# Patient Record
Sex: FEMALE | Race: WHITE | Employment: FULL TIME | URBAN - METROPOLITAN AREA
[De-identification: names, ages, dates, MRNs, and addresses within clinical notes are randomized per-mention and may not be internally consistent; named-entity substitution may affect disease eponyms.]

---

## 2021-09-11 ENCOUNTER — HOSPITAL ENCOUNTER (EMERGENCY)
Age: 61
Discharge: HOME | End: 2021-09-11
Attending: EMERGENCY MEDICINE
Payer: COMMERCIAL

## 2021-09-11 VITALS
SYSTOLIC BLOOD PRESSURE: 160 MMHG | TEMPERATURE: 98.6 F | HEART RATE: 74 BPM | HEIGHT: 65 IN | BODY MASS INDEX: 37.49 KG/M2 | WEIGHT: 225 LBS | RESPIRATION RATE: 16 BRPM | OXYGEN SATURATION: 98 % | DIASTOLIC BLOOD PRESSURE: 89 MMHG

## 2021-09-11 DIAGNOSIS — R09.81 NASAL CONGESTION: Primary | ICD-10-CM

## 2021-09-11 DIAGNOSIS — Z20.822 EXPOSURE TO COVID-19 VIRUS: ICD-10-CM

## 2021-09-11 PROCEDURE — U0003 INFECTIOUS AGENT DETECTION BY NUCLEIC ACID (DNA OR RNA); SEVERE ACUTE RESPIRATORY SYNDROME CORONAVIRUS 2 (SARS-COV-2) (CORONAVIRUS DISEASE [COVID-19]), AMPLIFIED PROBE TECHNIQUE, MAKING USE OF HIGH THROUGHPUT TECHNOLOGIES AS DESCRIBED BY CMS-2020-01-R: HCPCS

## 2021-09-11 PROCEDURE — 99282 EMERGENCY DEPT VISIT SF MDM: CPT

## 2021-09-11 RX ORDER — ESOMEPRAZOLE MAGNESIUM 40 MG/1
40 CAPSULE, DELAYED RELEASE ORAL DAILY
COMMUNITY

## 2021-09-11 NOTE — DISCHARGE INSTRUCTIONS
Lukas or reduced prescriptions       [facility]       Free Referrals    Hours of Operation       Mon/Tue/Thur/Fri: 10am-2pm       Tue/Thurs: 5pm-8:30pm    Address       [address] [facility] [place], [place]     Phone       [phone]    [address] ([facility] [place])     Lab Work        Prescription Coverage     Hours of Operation        By appointment only         Tue/Thurs: 5pm-8:30pm     Address         [facility] Department         [address], [place]     Phone         [phone]    [facility]         Free or Reduced Prescriptions for Clients, if available    Hours of Operation         Mon-Fri: 8am - 5pm    Address         [address], [place] [address] [place] [address]    Phone         [phone] or Reduced Prescription Coverage     Hours of Operation        Mon: 9am - 11am        Thur: 5:30pm-8pm     Address        [address]          [place], [place]     Phone        [phone] [facility]N, Pediatrics         Pharmacy Assistant Program         Sliding Scale after first 2 visits     Hours of Operation         Mon/Tue/Thur/Fri: 8:30am -5pm         Wed: 10:30am - 7pm     Address         [address] [facility] [address] [place] [address]     Phone          [phone] [facility] and [facility]         [facility]         Sliding Scale Payment         Prescription Program     Hours of Operation         Mon/Wed/Fri: 8:30am - 5pm         Tue: 7am - 7pm         Thur: 8am - noon; 4pm - 8pm      Address         [address] [place] [address] [place], [place]      Phone         [phone]                [facility] [address] [facility]         Reduced Fee Prescriptions     Hours of Operation         Mon-Fri: 8am - 5pm     Address         [address] [place], [place]     Phone         [phone] [address] [facility]         Reduced Fee Prescriptions     Hours of Operation         Mon/Wed/Thur/Fri: 8am - 5pm         Tue: 8am - 7pm     Address         [address] [facility]          [place], [place]     Phone         [phone]

## 2021-09-11 NOTE — ED PROVIDER NOTES
EMERGENCY DEPARTMENT HISTORY AND PHYSICAL EXAM    Date: 9/11/2021  Patient Name: Sam Guardian    History of Presenting Illness     Chief Complaint   Patient presents with    Concern For [id number] (Coronavirus)         History Provided By: Patient    [address] is a 28-year-old female with no known chronic medical conditions presents for evaluation of congestion, runny nose. Patient states that she has been caring for her mom and father who both have Covid. She started having symptoms 3 to 4 days ago. Her symptoms have been minimal, though she wants to make sure that she does not have Covid before she returns to work. Patient otherwise denies chest pain, shortness of breath, nausea, vomiting, diarrhea. Patient has previously been vaccinated. PCP: No primary care provider on file. Current Outpatient Medications   Medication Sig Dispense Refill    esomeprazole (NexIUM) 40 mg capsule Take 40 mg by mouth daily. Past History     Past Medical History:  Past Medical History:   Diagnosis Date    Acid reflux        Past Surgical History:  Past Surgical History:   Procedure Laterality Date    HX MENISCUS REPAIR      HX PARTIAL HYSTERECTOMY      HX ROTATOR CUFF REPAIR      HX TONSILLECTOMY         Family History:  History reviewed. No pertinent family history. Social History:  Social History     Tobacco Use    Smoking status: Never Smoker    Smokeless tobacco: Never Used   Substance Use Topics    Alcohol use: Yes     Comment: socially     Drug use: Not Currently       Allergies:  No Known Allergies      Review of Systems   Review of Systems   Constitutional: Negative for activity change and fever. HENT: Positive for postnasal drip and rhinorrhea. Negative for congestion and sore throat. Eyes: Negative for discharge. Respiratory: Negative for apnea. Cardiovascular: Negative for chest pain. Gastrointestinal: Negative for abdominal distention.    Genitourinary: Negative for dysuria and flank pain. Musculoskeletal: Negative for arthralgias. Skin: Negative for rash. Neurological: Negative for dizziness and weakness. Hematological: Negative for adenopathy. Psychiatric/Behavioral: Negative for agitation. All other systems reviewed and are negative. Physical Exam     Vitals:    09/11/21 1013 09/11/21 1016   BP: (!) 160/89    Pulse: 74    Resp: 16    Temp:  98.6 °F (37 °C)   SpO2: 98%    Weight: 102.1 kg (225 lb)    Height: 5' 5\" (1.651 m)      Physical Exam    Nursing notes and vital signs reviewed    Constitutional: Non toxic appearing, no acute distress  Head: Normocephalic, Atraumatic  Eyes: EOMI  Neck: Supple  Cardiovascular: Regular rate and rhythm, no murmurs, rubs, or gallops  Chest: Normal work of breathing and chest excursion bilaterally  Lungs: Clear to ausculation bilaterally  Abdomen: Soft, non tender, non distended, normoactive bowel sounds  Back: No evidence of trauma or deformity  Extremities: No evidence of trauma or deformity, no LE edema  Skin: Warm and dry, normal cap refill  Neuro: Alert and appropriate, CN intact, normal speech, strength and sensation full and symmetric bilaterally, normal gait, normal coordination  Psychiatric: Normal mood and affect      Diagnostic Study Results     Labs -   No results found for this or any previous visit (from the past 12 hour(s)). No orders to display     CT Results  (Last 48 hours)    None        CXR Results  (Last 48 hours)    None          Medications given in the ED-  Medications - No data to display      Medical Decision Making   I am the first provider for this patient. I reviewed the vital signs, available nursing notes, past medical history, past surgical history, family history and social history. Vital Signs-Reviewed the patient's vital signs. Pulse Oximetry Analysis - 100% on room air, not hypoxic     Records Reviewed:  Old Medical Records    Provider Notes (Medical Decision Making): Peggy Olivas Bonnie Webber is a 31-year-old female presents for evaluation requesting Covid testing given known Covid exposure and nasal congestion. Symptoms are minimal, patient denies any chest pain, shortness of breath and she is otherwise afebrile, nontoxic-appearing and hemodynamically stable. Will swab for Covid and have advised ongoing home isolation. Supportive home therapy recommended at this time. Reviewed return precautions guarding development of chest pain, shortness of breath, change in mental status or any new or concerning symptoms. Procedures:  Procedures      Diagnosis and Disposition       DISCHARGE NOTE:    Laron Heard's  results have been reviewed with her. She has been counseled regarding her diagnosis, treatment, and plan. She verbally conveys understanding and agreement of the signs, symptoms, diagnosis, treatment and prognosis and additionally agrees to follow up as discussed. She also agrees with the care-plan and conveys that all of her questions have been answered. I have also provided discharge instructions for her that include: educational information regarding their diagnosis and treatment, and list of reasons why they would want to return to the ED prior to their follow-up appointment, should her condition change. She has been provided with education for proper emergency department utilization. CLINICAL IMPRESSION:    1. Nasal congestion    2. Exposure to COVID-19 virus        PLAN:  1. D/C Home    Current Discharge Medication List        3. Follow-up Information     Follow up With Specialties Details Why [address] [facility]    THE [facility] EMERGENCY DEPT Emergency Medicine  As needed, If symptoms worsen [address] [address]  [phone]        _______________________________      Please note that this dictation was completed with [x+1], the [employer] voice recognition software.   Quite often unanticipated grammatical, syntax, homophones, and other interpretive errors are inadvertently transcribed by the computer software. Please disregard these errors. Please excuse any errors that have escaped final proofreading.

## 2021-09-11 NOTE — ED TRIAGE NOTES
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

## 2021-09-11 NOTE — Clinical Note
[facility] [place]  THE [facility] EMERGENCY DEPT  [address] [facility] NEWS [address]  [id number] [phone]    Work/School Note    Date: 9/11/2021     To Whom It May concern:    Jessie Salazar was evaulated by the following provider(s):  Attending Provider: Rama Blanchard MD.   Janet  virus is suspected. Per the CDC guidelines we recommend home isolation until the following conditions are all met:    1. At least 10 days have passed since symptoms first appeared and  2. At least 24 hours have passed since last fever without the use of fever-reducing medications and  3.  Symptoms (e.g., cough, shortness of breath) have improved    Sincerely,          Gustavo Ruff MD

## 2021-09-14 LAB — SARS-COV-2, COV2NT: NOT DETECTED

## 2022-08-18 ENCOUNTER — TELEPHONE (OUTPATIENT)
Dept: BEHAVIORAL/MENTAL HEALTH CLINIC | Age: 62
End: 2022-08-18

## 2023-05-22 RX ORDER — ESOMEPRAZOLE MAGNESIUM 40 MG/1
40 CAPSULE, DELAYED RELEASE ORAL DAILY
COMMUNITY